# Patient Record
Sex: FEMALE | Race: ASIAN | NOT HISPANIC OR LATINO | URBAN - METROPOLITAN AREA
[De-identification: names, ages, dates, MRNs, and addresses within clinical notes are randomized per-mention and may not be internally consistent; named-entity substitution may affect disease eponyms.]

---

## 2018-08-30 ENCOUNTER — TRANSCRIBE ORDERS (OUTPATIENT)
Dept: PERINATAL CARE | Facility: CLINIC | Age: 33
End: 2018-08-30

## 2018-08-30 DIAGNOSIS — O09.90 SUPERVISION OF HIGH RISK PREGNANCY, ANTEPARTUM, UNSPECIFIED TRIMESTER: Primary | ICD-10-CM

## 2018-08-30 DIAGNOSIS — O99.810 ABNORMAL MATERNAL GLUCOSE TOLERANCE, COMPLICATING PREGNANCY: ICD-10-CM

## 2018-10-15 ENCOUNTER — OFFICE VISIT (OUTPATIENT)
Dept: PERINATAL CARE | Facility: CLINIC | Age: 33
End: 2018-10-15
Payer: COMMERCIAL

## 2018-10-15 VITALS
HEIGHT: 60 IN | SYSTOLIC BLOOD PRESSURE: 108 MMHG | DIASTOLIC BLOOD PRESSURE: 69 MMHG | BODY MASS INDEX: 23.01 KG/M2 | HEART RATE: 91 BPM | WEIGHT: 117.2 LBS

## 2018-10-15 VITALS
HEIGHT: 60 IN | SYSTOLIC BLOOD PRESSURE: 108 MMHG | HEART RATE: 91 BPM | BODY MASS INDEX: 23.01 KG/M2 | DIASTOLIC BLOOD PRESSURE: 69 MMHG | WEIGHT: 117.2 LBS

## 2018-10-15 DIAGNOSIS — O24.410 DIET CONTROLLED GESTATIONAL DIABETES MELLITUS (GDM) IN SECOND TRIMESTER: Primary | ICD-10-CM

## 2018-10-15 DIAGNOSIS — O99.810 ABNORMAL GLUCOSE TOLERANCE TEST (GTT) DURING PREGNANCY, ANTEPARTUM: ICD-10-CM

## 2018-10-15 DIAGNOSIS — Z3A.16 16 WEEKS GESTATION OF PREGNANCY: ICD-10-CM

## 2018-10-15 PROCEDURE — G0108 DIAB MANAGE TRN  PER INDIV: HCPCS

## 2018-10-15 PROCEDURE — 99214 OFFICE O/P EST MOD 30 MIN: CPT | Performed by: NURSE PRACTITIONER

## 2018-10-15 RX ORDER — PNV,CALCIUM 72/IRON/FOLIC ACID 27 MG-1 MG
1 TABLET ORAL DAILY
Refills: 0 | COMMUNITY
Start: 2018-09-28 | End: 2019-01-07 | Stop reason: SDUPTHER

## 2018-10-15 RX ORDER — LANCETS
EACH MISCELLANEOUS
Qty: 100 EACH | Refills: 6 | Status: SHIPPED | OUTPATIENT
Start: 2018-10-15 | End: 2019-03-04 | Stop reason: SDUPTHER

## 2018-10-15 RX ORDER — LANCETS 30 GAUGE
EACH MISCELLANEOUS
COMMUNITY
Start: 2016-09-14 | End: 2018-10-15

## 2018-10-15 RX ORDER — FERROUS SULFATE 325(65) MG
1 TABLET ORAL DAILY
Refills: 0 | COMMUNITY
Start: 2018-09-21

## 2018-10-15 RX ORDER — BLOOD SUGAR DIAGNOSTIC
STRIP MISCELLANEOUS
Qty: 100 EACH | Refills: 6 | Status: SHIPPED | OUTPATIENT
Start: 2018-10-15 | End: 2019-03-04 | Stop reason: SDUPTHER

## 2018-10-15 RX ORDER — IRON,CARBONYL/ASCORBIC ACID 100-250 MG
325 TABLET ORAL
COMMUNITY
End: 2019-01-07 | Stop reason: SDUPTHER

## 2018-10-15 NOTE — PATIENT INSTRUCTIONS
1  Start gestational diabetes diet with 3 meals and 3 snacks including protein  Eat every 2 to 3 hours during the day and no more than 10 hours of fasting overnight  2  Start self monitoring blood glucose fasting and 2 hours after start of each meal  Goals fasting 60 to 90, 2 hours post start of meal 120 or less  3  Keep glucose log and report every Monday or as requested  4  Continue prenatal vitamin and iron as ordered by your OB  5  Stay active if no restriction from your OB, walking 30 minutes a day  6  Schedule ultrasound with Maternal Fetal Medicine  7  Continue follow-up with your OB and MFM as recommended  8  Schedule an appointment with dietician for diet review  9  Follow-up with diabetes education in office as needed

## 2018-10-15 NOTE — PROGRESS NOTES
DATE:  10/15/18  RE: Becky Hebert    : 1985    ADDISON: Estimated Date of Delivery: 3-30-19  EGA: 16w2d    Dear Dr christina HARTMAN [facility]: Thank you for referring your patient to the Diabetes and Pregnancy Program at [address]  The patient back to back educational sessions, declining a German interpretor, and received the following education:    Weight gain during in pregnancy  Based on the patients height of 5' (1 524 m) inches, pre-pregnancy weight of 112 pounds BMI 21 9, we would recommend a total weight gain of 25-35 pounds for the pregnancy   The patients current weight is 53 2 kg (117 lb 3 2 oz)pounds, and her weight gain to date is 5 pounds 3 2 ounces  Based on this, we recommend a weight gain of 25-30 pounds for the remainder of the pregnancy   Medical Nutrition Therapy for diabetes and pregnancy  The patient was instructed on the following:  o Individualized meal plan, 1700 calories  o Use of food diary to maintain a meal plan    o Importance of protein as it relates to blood glucose control   Ultrasounds every four weeks in the [address] to evaluate fetal growth   Exercise Guidelines:   o Walking up to thirty minutes daily can reduce blood glucose levels  o Monitor for greater than four contractions per hour     o The patient has been instructed not to begin physical activity if she has been instructed not to exercise by your office   Sick day guidelines and hypoglycemia with treatment   Post-partum guidelines:  o Completion of a 75 gram glucose tolerance test at 6 weeks post-partum to check for type 2 diabetes  o 20% weight loss and 30 minutes of exercise 5 times per week reduces the risk of type 2 diabetes   Breastfeeding guidelines   Report blood glucose levels to [address] weekly or as directed  o Phone: [phone]   If no response in 24 hours, call [phone]    o Fax: [phone]  o Email: [email or web address]    Please contact the Diabetes and Pregnancy Program at [phone] if you have questions  Time spent with patient [id number]; time spent face to face counseling greater than 50% of the appointment      Sincerely,     Melani Hanson, RN  Diabetes Educator  Diabetes and Pregnancy Program

## 2018-10-15 NOTE — PROGRESS NOTES
Assessment/Plan:      Diagnoses and all orders for this visit:    Diet controlled gestational diabetes mellitus (GDM) in second trimester  -     HEMOGLOBIN A1C W/ EAG ESTIMATION; Future  -     Comprehensive metabolic panel; Future  -     TSH, 3rd generation; Future  -     T4, free; Future  -     ACCU-CHEK GUIDE test strip; Use 4 a day and as instructed  -     ACCU-CHEK FASTCLIX LANCETS MISC; Use 4 a day and as directed  -     HEMOGLOBIN A1C W/ EAG ESTIMATION  -     Comprehensive metabolic panel  -     TSH, 3rd generation  -     T4, free    16 weeks gestation of pregnancy  -     HEMOGLOBIN A1C W/ EAG ESTIMATION; Future  -     Comprehensive metabolic panel; Future  -     TSH, 3rd generation; Future  -     T4, free; Future  -     ACCU-CHEK GUIDE test strip; Use 4 a day and as instructed  -     ACCU-CHEK FASTCLIX LANCETS MISC; Use 4 a day and as directed  -     HEMOGLOBIN A1C W/ EAG ESTIMATION  -     Comprehensive metabolic panel  -     TSH, 3rd generation  -     T4, free    Abnormal glucose tolerance test (GTT) during pregnancy, antepartum  -     HEMOGLOBIN A1C W/ EAG ESTIMATION; Future  -     Comprehensive metabolic panel; Future  -     TSH, 3rd generation; Future  -     T4, free; Future  -     ACCU-CHEK GUIDE test strip; Use 4 a day and as instructed  -     ACCU-CHEK FASTCLIX LANCETS MISC; Use 4 a day and as directed  -     HEMOGLOBIN A1C W/ EAG ESTIMATION  -     Comprehensive metabolic panel  -     TSH, 3rd generation  -     T4, free    Other orders  -     Discontinue: glucose blood test strip; Accu check smartview test strips  Check sugars up to 6 times daily  O24 414   -     ferrous sulfate 325 (65 Fe) mg tablet; Take 1 tablet by mouth daily  -     Iron-Vitamin C (IRON 100/C) 100-250 MG TABS; Take 325 mg by mouth 3 (three) times a day with meals  -     Discontinue: Lancets MISC; Accu check fast click lancets  Check sugars up to 6 times daily  O24 414  -     metFORMIN (GLUCOPHAGE) 500 mg tablet;  Take 2 tablet po with breakfast and 3 tablets po with dinner as instructed  -     IRON PO; Take 1 tablet by mouth  -     Prenatal Vit-Fe Fumarate-FA (PREPLUS) 27-1 MG TABS; Take 1 tablet by mouth daily        1  Start gestational diabetes diet with 3 meals and 3 snacks including protein  Eat every 2 to 3 hours during the day and no more than 10 hours of fasting overnight  2  Start self monitoring blood glucose fasting and 2 hours after start of each meal  Goals fasting 60 to 90, 2 hours post start of meal 120 or less  3  Keep glucose log and report every Monday or as requested  4  Continue prenatal vitamin and iron as ordered by your OB  5  Stay active if no restriction from your OB, walking 30 minutes a day  6  Schedule ultrasound with Maternal Fetal Medicine  7  Continue follow-up with your OB and MFM as recommended  8  Schedule an appointment with dietician for diet review  9  Follow-up with diabetes education in office as needed  Discussed importance of tight glucose control during pregnancy and risk factors reviewed  Reviewed insulin requirements during pregnancy  Encouraged to add protein to diet with nuts, egg and cheese  Subjective:     Patient ID: Jono Li is a 34 y o  female  , ADDISON 3/30/19, currently 16 2/7 weeks gestation  Referred for gestational diabetes management  History of GDM treated with Metformin in 2016, had a baby boy who weighed 7 lbs  She has 3 boys, a 15 yo, 7 yo and 3 yo  From [facility] and refused to use language line  Complains of fatigue and increase in thirst and hunger  Diet higher in carbs and veggies, does not eat meat  [address]- [place] has diabetes and father had open heart surgery  Review of Systems   Constitutional: Positive for fatigue  Respiratory: Negative for cough and shortness of breath  Gastrointestinal: Negative for constipation, nausea and vomiting  Endocrine: Positive for polydipsia and polyphagia  Negative for polyuria     Neurological: Negative for headaches  Psychiatric/Behavioral: Negative for sleep disturbance  Objective:  Vitals:    10/15/18 1314   BP: 108/69   Pulse: 91   Ht  5', wt  117 lbs  Per OB report 8/21/18 1 hour 141, 3 hour , 226, 98, 115  Physical Exam   Constitutional: She is oriented to person, place, and time  Vital signs are normal  She appears well-developed and well-nourished  Pulmonary/Chest: Effort normal    Musculoskeletal: Normal range of motion  Neurological: She is alert and oriented to person, place, and time  Psychiatric: She has a normal mood and affect   Her behavior is normal  Judgment and thought content normal

## 2018-10-22 ENCOUNTER — ULTRASOUND (OUTPATIENT)
Dept: PERINATAL CARE | Facility: CLINIC | Age: 33
End: 2018-10-22
Payer: COMMERCIAL

## 2018-10-22 ENCOUNTER — OFFICE VISIT (OUTPATIENT)
Dept: PERINATAL CARE | Facility: CLINIC | Age: 33
End: 2018-10-22
Payer: COMMERCIAL

## 2018-10-22 ENCOUNTER — OB ABSTRACT (OUTPATIENT)
Dept: PERINATAL CARE | Facility: CLINIC | Age: 33
End: 2018-10-22

## 2018-10-22 VITALS
BODY MASS INDEX: 23.29 KG/M2 | SYSTOLIC BLOOD PRESSURE: 94 MMHG | HEART RATE: 97 BPM | DIASTOLIC BLOOD PRESSURE: 65 MMHG | WEIGHT: 118.6 LBS | HEIGHT: 60 IN

## 2018-10-22 DIAGNOSIS — Z34.01 ENCOUNTER FOR SUPERVISION OF NORMAL FIRST PREGNANCY, FIRST TRIMESTER: Primary | ICD-10-CM

## 2018-10-22 DIAGNOSIS — Z3A.17 17 WEEKS GESTATION OF PREGNANCY: ICD-10-CM

## 2018-10-22 DIAGNOSIS — O24.410 DIET CONTROLLED GESTATIONAL DIABETES MELLITUS (GDM) IN SECOND TRIMESTER: Primary | ICD-10-CM

## 2018-10-22 PROCEDURE — G0108 DIAB MANAGE TRN  PER INDIV: HCPCS | Performed by: DIETITIAN, REGISTERED

## 2018-10-22 NOTE — PROGRESS NOTES
Date:  10/22/18  RE: Zain Hollins    : 1985  Estimated Date of Delivery: 3/30/2019  EGA: 17 2/7 weeks  [facility]: [facility]    Diet controlled gestational diabetes    Date Fasting Post-  breakfast Post-  lunch Post-  dinner Before bedtime Carbs Comments   10/15/18   102       10/21/18 115 119 111 120      10/22/18 116 112                      Current regimen:  1700 calorie GDM diet with 3 meals & 3 snacks  Self-Blood Glucose monitoring 4 times daily  Did not begin monitoring till yesterday  Plan:  Begin 1700 calorie vegetarian GDM diet in 3 meals & 3 snacks  Diet was adapted to her cultural preferences  Agreed to eat 3 oz chicken, beef or fish at lunch daily  Will use eggs & peanuts as protein sources at breakfast, lunch & snacks  Advised she may need to add protein powder at dinner  Advised to change bedtime snack to peanuts, 1 egg & cooked vegetables  Unable to stay for insulin instruction [place]  Date due to report next:  Thursday, 10/25/18  Advised need for insulin will be determined at that time      Susan Sung  Diabetes Educator   Diabetes and Pregnancy Program

## 2018-10-22 NOTE — PROGRESS NOTES
,DATE: 10/22/18   RE: Dali Oquendo   : 1985  ADDISON: Estimated Date of Delivery: 3/30/2019  EGA:  16     Dear Lubna  At [employer] [facility]: Thank you for referring your patient to the Diabetes and Pregnancy Program at [facility]  The patient was seen today for medical nutrition therapy follow-up  for the treatment of diabetes during pregnancy  In addition to diabetes, the nutrition status is complicated by culture & following a vegetarian diet  The following was reviewed with the patient:      Weight gain during in pregnancy  Based on the patients height of 60  inches, pre-pregnancy weight of 112 pounds (BMI 21 9), we would recommend a total weight gain of 25-35 pounds for the pregnancy   The patients current weight is 118 5  pounds, and her weight gain to date is 6 5 pounds  Basic review of macronutrients   Meal pattern should consist of three small meals and three snacks daily   Appropriate serving size of foods   Incorporating protein at each meal and snack in the importance of protein in relationship to blood glucose control   Individualized meal plan: 1700 calorie--vegetarian gestational diabetes diet  Meal plan adapted to her cultural eating habits  Protein sources are--small amount chicken, beef or fish weekly, eggs, nuts carl peanuts & yogurt  Prefers only vegetarian foods at dinner; advised to eat an egg & add protein powder  Agreed to eat 3 oz chicken or beef or fish at lunch daily  Due to high FBS results, advised to change HS to only protein; agreed to change to peanuts, 1 egg & cooked vegetables   Report blood glucose levels to [address] weekly or as directed  Was not able to stay for insulin education as had another appointment in 1/2 hour in [facility]  o Phone: [phone]  If no response in 24 hours, call [phone]   o Fax: [phone]  o Email: [email or web address]   Follow up: As needed    Thank you for the opportunity to participate in the care of this patient  I can be reached at [phone] should you have any questions  Time spent with patient 11:15-11:55 AM; time spent face to face counseling greater than 50% of the appointment      Sincerely,     Marie Salcedo  Diabetes Educator  Diabetes and Pregnancy Program

## 2018-11-02 LAB
ALBUMIN SERPL-MCNC: 3.8 G/DL (ref 3.5–5.5)
ALBUMIN/GLOB SERPL: 1.5 {RATIO} (ref 1.2–2.2)
ALP SERPL-CCNC: 46 IU/L (ref 39–117)
ALT SERPL-CCNC: 18 IU/L (ref 0–32)
AST SERPL-CCNC: 21 IU/L (ref 0–40)
BILIRUB SERPL-MCNC: <0.2 MG/DL (ref 0–1.2)
BUN SERPL-MCNC: 5 MG/DL (ref 6–20)
BUN/CREAT SERPL: 10 (ref 9–23)
CALCIUM SERPL-MCNC: 9.2 MG/DL (ref 8.7–10.2)
CHLORIDE SERPL-SCNC: 101 MMOL/L (ref 96–106)
CO2 SERPL-SCNC: 22 MMOL/L (ref 20–29)
CREAT SERPL-MCNC: 0.5 MG/DL (ref 0.57–1)
EST. AVERAGE GLUCOSE BLD GHB EST-MCNC: 131 MG/DL
GLOBULIN SER-MCNC: 2.6 G/DL (ref 1.5–4.5)
GLUCOSE SERPL-MCNC: 96 MG/DL (ref 65–99)
HBA1C MFR BLD: 6.2 % (ref 4.8–5.6)
POTASSIUM SERPL-SCNC: 3.6 MMOL/L (ref 3.5–5.2)
PROT SERPL-MCNC: 6.4 G/DL (ref 6–8.5)
SL AMB EGFR AFRICAN AMERICAN: 149 ML/MIN/1.73
SL AMB EGFR NON AFRICAN AMERICAN: 129 ML/MIN/1.73
SODIUM SERPL-SCNC: 137 MMOL/L (ref 134–144)
T4 FREE SERPL-MCNC: 0.94 NG/DL (ref 0.82–1.77)
TSH SERPL DL<=0.005 MIU/L-ACNC: 1.81 UIU/ML (ref 0.45–4.5)

## 2018-11-12 ENCOUNTER — OB ABSTRACT (OUTPATIENT)
Dept: PERINATAL CARE | Facility: CLINIC | Age: 33
End: 2018-11-12

## 2018-11-12 ENCOUNTER — OFFICE VISIT (OUTPATIENT)
Dept: PERINATAL CARE | Facility: CLINIC | Age: 33
End: 2018-11-12
Payer: COMMERCIAL

## 2018-11-12 DIAGNOSIS — O24.414 INSULIN CONTROLLED GESTATIONAL DIABETES MELLITUS IN SECOND TRIMESTER: Primary | ICD-10-CM

## 2018-11-12 DIAGNOSIS — Z3A.20 20 WEEKS GESTATION OF PREGNANCY: ICD-10-CM

## 2018-11-12 PROCEDURE — G0108 DIAB MANAGE TRN  PER INDIV: HCPCS | Performed by: DIETITIAN, REGISTERED

## 2018-11-12 NOTE — PROGRESS NOTES
DATE: 18   RE: aRh Or   : 1985  ADDISON: Estimated Date of Delivery: 3/30/2019  EGA:    20 2/7 weeks    Dear Drs  At [facility]:     Your patient was seen in the office today for insulin teaching  Please refer to Diabetes and Pregnancy Progress Record for complete documentation of patients blood glucose levels  Height: 60"     Weight: 118 6 pounds      The patient was instructed on the following:     Lucero Kwik Pen use  Initiate 16 units Basaglar at bedtime   Insulin administration times, insulin action   Hypoglycemia signs, symptoms and treatment   Increase in maternal-fetal surveillance with insulin initiation   Side effects of hyperglycemia in pregnancy including macrosomia,  hypoglycemia, polyhydramnios, pre-term labor and stillbirth   Continue to monitor blood glucoses via fingerstick fasting (goal 60 mg/dl to 90 mg/dl) and two hours post prandial (goal less than 120 mg/dl)   Follow up with our office on Friday, 18 for evaluation of blood glucose levels   Non-stress testing two times weekly and ANNETTE testing beginning at 32 weeks gestation   Ultrasounds every 4 weeks at the [address]   HbA1c every 6 to 8 weeks, CMP ordered several weeks ago  Thank you for the opportunity to participate in the care of this patient  I can be reached at [phone] should you have any questions  Time spent with patient 9:15-10 AM; time spent face to face counseling greater than 50% of the appointment      Sincerely,     Dom Candelaria  Diabetes Educator  Diabetes and Pregnancy Program

## 2018-11-12 NOTE — PROGRESS NOTES
Date:  18  RE: Spring Lucia    : 1985  Estimated Date of Delivery: 3/30/2019  EGA: 20 2/7 weeks  OB/GYN: [facility]    Diet controlled gestational diabetes    Date Fasting Post-  breakfast Post-  lunch Post-  dinner Before bedtime Carbs Comments   10/29/18 108         10/30/18 107         18 115         18 116         18 100 120 132 125      18 108 133 140 120      11/10/18 102 122 129 130      18 99 120 130 140      Many missing BG results  Current regimen:  1700 calorie GDM--vegetarian diet with 3 meals & 3 snacks  Self-Blood Glucose monitoring 4 times daily  Plan:  Begin 16 units Basaglar at bedtime; prescriptions sent to her pharmacy  Diet was adapted to her cultural preferences  Had agreed at last visit to eat 3 oz chicken, beef or fish at lunch daily, but patient admits she does not always eat meat daily  Uses eggs & peanuts as protein sources at breakfast, dinner & snacks  Advised she may need to add protein powder at dinner  Admits to skipping HS snack as continues to eat dinner at 10:30 PM   Her late dinner is contributing to her high FBS  Admits she does not always add protein foods to each meal & snack  Continuing monitoring 4 times daily  Reports she had a low Hgb & is to begin Fe infusions  18 KlE1a-7 2%; reorder week of 18      Date due to report next:  Friday, 18    Mejia Penn  Diabetes Educator   Diabetes and Pregnancy Program

## 2018-11-13 RX ORDER — INSULIN GLARGINE 100 [IU]/ML
INJECTION, SOLUTION SUBCUTANEOUS
Qty: 15 ML | Refills: 0 | Status: SHIPPED | OUTPATIENT
Start: 2018-11-13 | End: 2019-01-07 | Stop reason: SDUPTHER

## 2018-11-13 RX ORDER — BLOOD SUGAR DIAGNOSTIC
1 STRIP MISCELLANEOUS DAILY
Qty: 100 EACH | Refills: 5 | Status: SHIPPED | OUTPATIENT
Start: 2018-11-13 | End: 2019-01-07 | Stop reason: SDUPTHER

## 2019-01-07 ENCOUNTER — OB ABSTRACT (OUTPATIENT)
Dept: PERINATAL CARE | Facility: CLINIC | Age: 34
End: 2019-01-07

## 2019-01-07 DIAGNOSIS — Z3A.28 28 WEEKS GESTATION OF PREGNANCY: Primary | ICD-10-CM

## 2019-01-07 DIAGNOSIS — O24.414 INSULIN CONTROLLED GESTATIONAL DIABETES MELLITUS IN SECOND TRIMESTER: ICD-10-CM

## 2019-01-07 DIAGNOSIS — D56.0 ALPHA+ THALASSEMIA (HCC): ICD-10-CM

## 2019-01-07 DIAGNOSIS — O99.019 IRON DEFICIENCY ANEMIA DURING PREGNANCY: ICD-10-CM

## 2019-01-07 DIAGNOSIS — D50.9 IRON DEFICIENCY ANEMIA DURING PREGNANCY: ICD-10-CM

## 2019-01-07 DIAGNOSIS — O24.414 INSULIN CONTROLLED GESTATIONAL DIABETES MELLITUS (GDM) IN THIRD TRIMESTER: ICD-10-CM

## 2019-01-07 RX ORDER — BLOOD SUGAR DIAGNOSTIC
1 STRIP MISCELLANEOUS DAILY
Qty: 100 EACH | Refills: 0 | Status: SHIPPED | OUTPATIENT
Start: 2019-01-07 | End: 2019-02-27 | Stop reason: SDUPTHER

## 2019-01-07 RX ORDER — PNV,CALCIUM 72/IRON/FOLIC ACID 27 MG-1 MG
1 TABLET ORAL DAILY
Qty: 90 TABLET | Refills: 3 | Status: SHIPPED | OUTPATIENT
Start: 2019-01-07

## 2019-01-07 RX ORDER — IRON,CARBONYL/ASCORBIC ACID 100-250 MG
325 TABLET ORAL 2 TIMES DAILY WITH MEALS
Qty: 90 EACH | Refills: 3 | Status: SHIPPED | OUTPATIENT
Start: 2019-01-07

## 2019-01-07 RX ORDER — INSULIN GLARGINE 100 [IU]/ML
INJECTION, SOLUTION SUBCUTANEOUS
Qty: 15 ML | Refills: 0 | Status: SHIPPED | OUTPATIENT
Start: 2019-01-07 | End: 2019-02-27 | Stop reason: SDUPTHER

## 2019-01-07 NOTE — PROGRESS NOTES
Met with patient at [facility]  Taking insulin nightly; fastings,   I refilled her needles and increased the Basaglar to 22 units nightly  Instructed her how to replace the batteries on her glucose meter which ran out a week ago  Asked her to re-establish care with [facility] program ASAMAGDA Beauchamp MD

## 2019-01-10 ENCOUNTER — OB ABSTRACT (OUTPATIENT)
Dept: PERINATAL CARE | Facility: CLINIC | Age: 34
End: 2019-01-10

## 2019-01-10 NOTE — PROGRESS NOTES
Date:  01/10/19  RE: Nichole Alva    : 1985  Estimated Date of Delivery: 3/30/2019  EGA: 28 5/7 weeks  [facility]: [facility]    Insulin controlled gestational diabetes    Date Fasting Post-  breakfast Post-  lunch Post-  dinner Before bedtime Carbs Comments                                                                                   Last blood glucose report was on 18  Dr Flor Joshua met with patient at [facility] on 19  Fasting blood sugars range between 99-10 mg/dl  Paddy Skates was increased to 22 units at bedtime  A refill prescription for needles was sent to the pharmacy as well as glucose meter batteries were replaced per Dr Edilson Palomo notes  Current regimen:  Basaglar- 22 units at bedtime  1700 calorie GDM--vegetarian diet with 3 meals & 3 snacks  Self-Blood Glucose monitoring 4 times daily  Plan:  A voicemail message was left for patient requesting blood glucose report  Blood sugar reporting procedure was again reviewed  An e-mail was also sent to Benton Lechuga RD who works at [employer] at [facility] to request a follow-up appointment to reinforce blood glucose reporting and GDM diet  Patient speaks Yoruba  Basaglar- 22 units at bedtime  Diet was adapted to her cultural preferences based on her last blood glucose report  Patient agreed to eat 3 oz chicken, beef or fish at lunch daily  Uses eggs & peanuts as protein sources at breakfast, dinner & snacks  Advised patient that protein powder may need to be added at dinner  Patient had admited to skipping bedtime snack due to eating dinner at 10:30 PM   Her late dinner was contributing to her high FBS  Continuing monitoring 4 times daily  Patient reported she had a low Hgb & is to begin Fe infusions at her last appointmet in 18 LsU8b-1 2%; reorder week of 18  Will schedule a follow-up appointment to provide lab prescription for HbA1c and reinforce both diet and blood glucose reporting  Date due to report next:  Requested patient report blood glucose values today on voicemail message       Tyree Serrato  Diabetes Educator   Diabetes and Pregnancy Program

## 2019-01-14 ENCOUNTER — OFFICE VISIT (OUTPATIENT)
Dept: PERINATAL CARE | Facility: CLINIC | Age: 34
End: 2019-01-14
Payer: COMMERCIAL

## 2019-01-14 VITALS
DIASTOLIC BLOOD PRESSURE: 68 MMHG | SYSTOLIC BLOOD PRESSURE: 114 MMHG | BODY MASS INDEX: 25.19 KG/M2 | WEIGHT: 128.3 LBS | HEART RATE: 101 BPM | HEIGHT: 60 IN

## 2019-01-14 DIAGNOSIS — Z3A.29 29 WEEKS GESTATION OF PREGNANCY: ICD-10-CM

## 2019-01-14 DIAGNOSIS — O24.414 INSULIN CONTROLLED GESTATIONAL DIABETES MELLITUS (GDM) IN THIRD TRIMESTER: Primary | ICD-10-CM

## 2019-01-14 PROCEDURE — G0108 DIAB MANAGE TRN  PER INDIV: HCPCS

## 2019-01-14 NOTE — PROGRESS NOTES
Date:  19  RE: Anna Garcia    : 1985  Estimated Date of Delivery: 3/30/19  EGA: 29w2d  OB[facility]GYN: [facility]      Blood glucose values from patient log she brought to appointment, no other BG values recorded in this logbook  Current regimen:  Basaglar-  22 units increased to this dose last week by Dr Carlson Angry  1700 calorie GDM--vegetarian diet with 3 meals & 3 snacks including protein     Self-Blood Glucose monitoring fasting and 2 hours after start of each meal with      Plan:  Though patient declined all communication with patient today was with Sam interpretor, Dawit #[id number]  Basaglar-increase to 28 units at bedtime  Add metformin- 500 mg with dinner, titrate weekly  Emphasized need to eat protein at all meals and snacks patient shares she is eating eggs, peanuts and cheeses as proteins  Discussed importance of SMBG everyday and need to report weekly  Established will call patient weekly at [phone], on , with Sam interpretor, to obtain blood sugars  18 HbA1c- 6 2% CMP- WNL during pregnancy, re-ordered labs today with instruction to complete this week  Date due to report next:  Scheduled follow up visit with RD in [address] [facility] for Monday, 19 at 1100 and requested patient bring blood sugars to appointment      Marybeth Limon RN  Diabetes Educator   Diabetes and Pregnancy Program

## 2019-01-16 LAB
ALBUMIN SERPL-MCNC: 3.6 G/DL (ref 3.5–5.5)
ALBUMIN/GLOB SERPL: 1.3 {RATIO} (ref 1.2–2.2)
ALP SERPL-CCNC: 62 IU/L (ref 39–117)
ALT SERPL-CCNC: 15 IU/L (ref 0–32)
AST SERPL-CCNC: 19 IU/L (ref 0–40)
BILIRUB SERPL-MCNC: <0.2 MG/DL (ref 0–1.2)
BUN SERPL-MCNC: 8 MG/DL (ref 6–20)
BUN/CREAT SERPL: 15 (ref 9–23)
CALCIUM SERPL-MCNC: 9.1 MG/DL (ref 8.7–10.2)
CHLORIDE SERPL-SCNC: 104 MMOL/L (ref 96–106)
CO2 SERPL-SCNC: 21 MMOL/L (ref 20–29)
CREAT SERPL-MCNC: 0.52 MG/DL (ref 0.57–1)
EST. AVERAGE GLUCOSE BLD GHB EST-MCNC: 114 MG/DL
GLOBULIN SER-MCNC: 2.7 G/DL (ref 1.5–4.5)
GLUCOSE SERPL-MCNC: 86 MG/DL (ref 65–99)
HBA1C MFR BLD: 5.6 % (ref 4.8–5.6)
POTASSIUM SERPL-SCNC: 3.4 MMOL/L (ref 3.5–5.2)
PROT SERPL-MCNC: 6.3 G/DL (ref 6–8.5)
SL AMB EGFR AFRICAN AMERICAN: 147 ML/MIN/1.73
SL AMB EGFR NON AFRICAN AMERICAN: 128 ML/MIN/1.73
SODIUM SERPL-SCNC: 139 MMOL/L (ref 134–144)

## 2019-01-17 DIAGNOSIS — O24.419 GDM, CLASS A2: Primary | ICD-10-CM

## 2019-01-21 ENCOUNTER — OB ABSTRACT (OUTPATIENT)
Dept: PERINATAL CARE | Facility: CLINIC | Age: 34
End: 2019-01-21

## 2019-01-21 ENCOUNTER — OFFICE VISIT (OUTPATIENT)
Dept: PERINATAL CARE | Facility: CLINIC | Age: 34
End: 2019-01-21
Payer: COMMERCIAL

## 2019-01-21 VITALS
DIASTOLIC BLOOD PRESSURE: 84 MMHG | BODY MASS INDEX: 25.39 KG/M2 | SYSTOLIC BLOOD PRESSURE: 123 MMHG | WEIGHT: 130 LBS | HEART RATE: 109 BPM

## 2019-01-21 DIAGNOSIS — Z3A.30 30 WEEKS GESTATION OF PREGNANCY: ICD-10-CM

## 2019-01-21 DIAGNOSIS — O24.414 INSULIN CONTROLLED GESTATIONAL DIABETES MELLITUS (GDM) IN THIRD TRIMESTER: Primary | ICD-10-CM

## 2019-01-21 PROCEDURE — G0108 DIAB MANAGE TRN  PER INDIV: HCPCS

## 2019-01-21 NOTE — PROGRESS NOTES
Date:  19  RE: Nichole Alva    : 1985  Estimated Date of Delivery: 3/30/19  EGA: 30w2d  [facility]      Insulin controlled gestational diabetes  Date Fasting Post-  breakfast Post-  lunch Post-  dinner Before bedtime Carbs Comments    94 144 120 115       93 110 118 113       98 110                                                  Follow up appointment completed today with Sam interpretor, Magenalina #[id number]  Current regimen:  Basaglar-  28 units, however patient reported she only increased to 26 units  Metformin-500 mg with dinner, titrate weekly  1700 calorie GDM--vegetarian diet with 3 meals & 3 snacks including protein     Self-Blood Glucose monitoring fasting and 2 hours after start of each meal with an Accu-chek guide blood glucose monitor  Plan:  Basaglar-increase to 28 units at bedtime  Fasting blood sugar and 2 hour pp target ranges were reviewed with patient  Patient is agreeable to increase to 28 units  Metformin- add 500 mg with breakfast, continue 500 mg with dinner  Emphasized need to eat protein at all meals and snacks patient shares she is eating eggs, peanuts and cheeses, nut butters, some chicken and fish as proteins  Patient understands the importance of timing of meals and snacks  Discussed importance of SMBG everyday and need to report weekly  Established will call patient weekly at [phone], on , with Sam interpretor, to obtain blood sugars per previous report  Reviewed sending a photo of blood sugar log book via blood sugar e-mail with patient  Patient's  is agreeable to report blood glucose values via e-mail  18 HbA1c- 6 2% CMP- WNL during pregnancy, re-ordered labs today with instruction to complete this week  Again encouraged patient to complete blood work      Date due to report next:  Friday, 19    Ana Lilia Ar, RD,LDN,CDE  Diabetes Educator   Diabetes and Pregnancy Program

## 2019-01-21 NOTE — PROGRESS NOTES
DATE: 19   RE: Binta Galan   : 1985  ADDSION: Estimated Date of Delivery: 3/30/19  EGA:  30w2d    Dear Dr Bev Siddiqi: Thank you for referring your patient to the Diabetes and Pregnancy Program at [address] [place] [address]  The patient was seen today for medical nutrition therapy for the treatment of diabetes during pregnancy  In addition to diabetes, the nutrition status is complicated by vegan diet, protein sources include nuts,egg, small amount of chicken, beef and some fish, yogurt  The patient is agreeable to add protein powder as needed  The following was reviewed with the patient:      Weight gain during in pregnancy  Based on the patients height of 60 inches, pre-pregnancy weight of 112 pounds (BMI 21 9) we would recommend a total weight gain of 25-35 pounds for the pregnancy  o The patients current weight is 59 kg (130 lb) pounds, and her weight gain to date is 18 pounds  Based on this, we are recommending the patient gain no more than 17 pounds for the remainder of the pregnancy   Basic review of macronutrients   Meal pattern should consist of three small meals and three snacks daily   Carbohydrate gram amounts per meal    Instructions on how to read a food label   Appropriate serving size of foods   Incorporating protein at each meal and snack in the importance of protein in relationship to blood glucose control   Individualized meal plan: approximately 1800 calorie gestational diabetes diet   Diet history revealed patient is following diet fairly well and has been including protein in meals and most snacks  Patient is eating a late dinner at 9-10 pm  Patient is often missing mid-afternoon snack  Advised to consistently follow meal plan with appropriate timing of meals and snacks, eating an earlier dinner with a bedtime snack  Instructed patient to maintain an 8-10 hour time frame between bedtime snack and fasting blood glucose check   Report blood glucose levels to [address] weekly or as directed  Reviewed testing and reporting procedure with patient  Patient stated her  will e-mail a copy of blood sugar log book on next report  o Phone: [phone]  If no response in 24 hours, call [phone]   o Fax: [phone]  o Email: [email or web address]   Noted last 1/15/19 HbA1c  was 5 6% , 11/1/18 HbA1c 6 2%  Next Ultrasound is scheduled for 2/4/19  Thank you for the opportunity to participate in the care of this patient  I can be reached at [phone] should you have any questions  Time spent with patient [phone]; time spent face to face counseling greater than 50% of the appointment      Sincerely,     Keyla Cadena RD,LDN,CDE  Diabetes Educator  Diabetes and Pregnancy Program

## 2019-02-04 ENCOUNTER — ULTRASOUND (OUTPATIENT)
Dept: PERINATAL CARE | Facility: CLINIC | Age: 34
End: 2019-02-04
Payer: COMMERCIAL

## 2019-02-04 VITALS
SYSTOLIC BLOOD PRESSURE: 121 MMHG | WEIGHT: 127.5 LBS | BODY MASS INDEX: 25.03 KG/M2 | DIASTOLIC BLOOD PRESSURE: 70 MMHG | HEART RATE: 106 BPM | HEIGHT: 60 IN

## 2019-02-04 DIAGNOSIS — Z3A.32 32 WEEKS GESTATION OF PREGNANCY: ICD-10-CM

## 2019-02-04 DIAGNOSIS — O24.414 INSULIN CONTROLLED GESTATIONAL DIABETES MELLITUS (GDM) IN THIRD TRIMESTER: Primary | ICD-10-CM

## 2019-02-04 PROCEDURE — 76816 OB US FOLLOW-UP PER FETUS: CPT | Performed by: OBSTETRICS & GYNECOLOGY

## 2019-02-04 PROCEDURE — 99212 OFFICE O/P EST SF 10 MIN: CPT | Performed by: OBSTETRICS & GYNECOLOGY

## 2019-02-04 NOTE — PROGRESS NOTES
Please refer to the [facility] ultrasound report in Ob Procedures for additional information regarding the visit to the [facility]  today

## 2019-02-27 ENCOUNTER — DOCUMENTATION (OUTPATIENT)
Dept: PERINATAL CARE | Facility: CLINIC | Age: 34
End: 2019-02-27

## 2019-02-27 DIAGNOSIS — O24.414 INSULIN CONTROLLED GESTATIONAL DIABETES MELLITUS IN SECOND TRIMESTER: ICD-10-CM

## 2019-02-27 DIAGNOSIS — O24.414 INSULIN CONTROLLED GESTATIONAL DIABETES MELLITUS (GDM) IN THIRD TRIMESTER: ICD-10-CM

## 2019-02-27 RX ORDER — INSULIN GLARGINE 100 [IU]/ML
INJECTION, SOLUTION SUBCUTANEOUS
Qty: 15 ML | Refills: 0 | Status: SHIPPED | OUTPATIENT
Start: 2019-02-27 | End: 2019-07-14

## 2019-02-27 RX ORDER — BLOOD SUGAR DIAGNOSTIC
STRIP MISCELLANEOUS
Qty: 100 EACH | Refills: 2 | Status: SHIPPED | OUTPATIENT
Start: 2019-02-27

## 2019-02-27 NOTE — PROGRESS NOTES
Date:  19  RE: Becky Hebert    : 1985  Estimated Date of Delivery: 3/30/19  EGA: 35w4d  OB/GYN: [facility]      Insulin controlled gestational diabetes  Date Fasting Post-  breakfast Post-  lunch Post-  dinner Before bedtime Carbs Comments                                   Last reported BG was at appointment on 19  Diabetes [facility] Pregnancy program was contacted today by Guanakito Crow RD, CDE at [place] [id number] via e-mail  She reported patient is no longer taking insulin as it is "stuck" in the pen, cannot get more insulin from her pharmacy & stated her pen needles are not working  Also, reported she is no longer testing her BG  Current regimen:  Basaglar-  28 units  Metformin-500 mg with breakfast & dinner  1700 calorie GDM--vegetarian diet with 3 meals & 3 snacks including protein     Self-Blood Glucose monitoring fasting and 2 hours after start of each meal with an Accu-chek guide blood glucose monitor  Plan:  Attempted to contact patient several times via phone to her home & to her   Left message with  that new insulin & pen needle prescriptions were sent to her pharmacy  Continue current regimen especially with protein at all meals and snacks  Discussed importance of SMBG everyday and need to report weekly  Patient's  had agreed before to report blood glucose values via e-mail  18 HbA1c- 6 2% Patient needs to complete blood work soon       Date due to report next:  As soon as possible  Follow-up appointment to be scheduled by Diabetes & Pregnancy       Ashley Sánchez RD,LDN,CDE  Diabetes Educator   [facility]

## 2019-03-04 ENCOUNTER — OFFICE VISIT (OUTPATIENT)
Dept: PERINATAL CARE | Facility: CLINIC | Age: 34
End: 2019-03-04
Payer: COMMERCIAL

## 2019-03-04 ENCOUNTER — ULTRASOUND (OUTPATIENT)
Dept: PERINATAL CARE | Facility: CLINIC | Age: 34
End: 2019-03-04
Payer: COMMERCIAL

## 2019-03-04 VITALS
HEART RATE: 103 BPM | SYSTOLIC BLOOD PRESSURE: 116 MMHG | DIASTOLIC BLOOD PRESSURE: 78 MMHG | BODY MASS INDEX: 26.66 KG/M2 | WEIGHT: 135.8 LBS | HEIGHT: 60 IN

## 2019-03-04 VITALS
WEIGHT: 132 LBS | BODY MASS INDEX: 25.91 KG/M2 | HEIGHT: 60 IN | SYSTOLIC BLOOD PRESSURE: 104 MMHG | DIASTOLIC BLOOD PRESSURE: 68 MMHG | HEART RATE: 100 BPM

## 2019-03-04 DIAGNOSIS — Z91.19 NONADHERENCE TO MEDICAL TREATMENT: ICD-10-CM

## 2019-03-04 DIAGNOSIS — O24.410 DIET CONTROLLED GESTATIONAL DIABETES MELLITUS (GDM) IN SECOND TRIMESTER: ICD-10-CM

## 2019-03-04 DIAGNOSIS — O99.810 HYPERGLYCEMIA DURING PREGNANCY: ICD-10-CM

## 2019-03-04 DIAGNOSIS — O24.414 INSULIN CONTROLLED GESTATIONAL DIABETES MELLITUS (GDM) IN THIRD TRIMESTER: Primary | ICD-10-CM

## 2019-03-04 DIAGNOSIS — O99.810 ABNORMAL GLUCOSE TOLERANCE TEST (GTT) DURING PREGNANCY, ANTEPARTUM: ICD-10-CM

## 2019-03-04 DIAGNOSIS — Z3A.36 36 WEEKS GESTATION OF PREGNANCY: ICD-10-CM

## 2019-03-04 DIAGNOSIS — Z3A.16 16 WEEKS GESTATION OF PREGNANCY: ICD-10-CM

## 2019-03-04 DIAGNOSIS — O40.3XX0 POLYHYDRAMNIOS AFFECTING PREGNANCY IN THIRD TRIMESTER: ICD-10-CM

## 2019-03-04 DIAGNOSIS — Z36.89 ENCOUNTER FOR ULTRASOUND TO CHECK FETAL GROWTH: ICD-10-CM

## 2019-03-04 PROBLEM — Z91.199 NONADHERENCE TO MEDICAL TREATMENT: Status: ACTIVE | Noted: 2019-03-04

## 2019-03-04 PROCEDURE — 59025 FETAL NON-STRESS TEST: CPT | Performed by: OBSTETRICS & GYNECOLOGY

## 2019-03-04 PROCEDURE — 99214 OFFICE O/P EST MOD 30 MIN: CPT | Performed by: NURSE PRACTITIONER

## 2019-03-04 PROCEDURE — 76816 OB US FOLLOW-UP PER FETUS: CPT | Performed by: OBSTETRICS & GYNECOLOGY

## 2019-03-04 RX ORDER — LANCETS
EACH MISCELLANEOUS
Qty: 100 EACH | Refills: 6 | Status: SHIPPED | OUTPATIENT
Start: 2019-03-04

## 2019-03-04 RX ORDER — BLOOD SUGAR DIAGNOSTIC
STRIP MISCELLANEOUS
Qty: 100 EACH | Refills: 6 | Status: SHIPPED | OUTPATIENT
Start: 2019-03-04

## 2019-03-04 NOTE — PATIENT INSTRUCTIONS
Thank you for choosing [facility]  for your  care today  If you have any questions about your ultrasound or care, please do not hesitate to contact us or your primary obstetrician  Please stay in close contact with the diabetes educators

## 2019-03-04 NOTE — PROGRESS NOTES
Assessment/Plan:    No problem-specific Assessment & Plan notes found for this encounter  Diagnoses and all orders for this visit:    Insulin controlled gestational diabetes mellitus (GDM) in third trimester  -     ACCU-CHEK FASTCLIX LANCETS MISC; Use 4 a day and as directed  -     ACCU-CHEK GUIDE test strip; Use 4 a day and as instructed  -     Hemoglobin A1C    36 weeks gestation of pregnancy  -     ACCU-CHEK FASTCLIX LANCETS MISC; Use 4 a day and as directed  -     ACCU-CHEK GUIDE test strip; Use 4 a day and as instructed  -     Hemoglobin A1C    Hyperglycemia during pregnancy  -     ACCU-CHEK FASTCLIX LANCETS MISC; Use 4 a day and as directed  -     ACCU-CHEK GUIDE test strip; Use 4 a day and as instructed  -     Hemoglobin A1C    Polyhydramnios affecting pregnancy in third trimester  -     ACCU-CHEK FASTCLIX LANCETS MISC; Use 4 a day and as directed  -     ACCU-CHEK GUIDE test strip; Use 4 a day and as instructed  -     Hemoglobin A1C    Nonadherence to medical treatment  -     ACCU-CHEK FASTCLIX LANCETS MISC; Use 4 a day and as directed  -     ACCU-CHEK GUIDE test strip; Use 4 a day and as instructed  -     Hemoglobin A1C    Diet controlled gestational diabetes mellitus (GDM) in second trimester    16 weeks gestation of pregnancy    Abnormal glucose tolerance test (GTT) during pregnancy, antepartum      1  Due to fasting glucose above 90, increase basaglar from 28 to 36 units at 9 pm daily  Please have bedtime snack with 1 carb and 2 protein servings  Add 3 am glucose reading  2  Metformin 500 mg with breakfast and 1000 mg with dinner  To be titrated as recommended  3  Please try to eat 3 meals and 3 snacks including recommended combination of protein, carbohydrate and fat per meal/snack  If unable to eat try using Glucerna or Boost as a replacement for a snack  No more than 8 to 10 hours of fasting overnight    4  Please be sure to check fasting and 2 hours after start of each meal  Keep glucose log and report glucose readings on Thursday, 3/7/19  At minimum report once a week  New glucose meter provided  5  Stay active if no restriction from your OB, up to 30 minutes a day of walking  6  Continue prenatal vitamin and follow-up with your OB  7  Schedule an appointment with family doctor regarding difficulty swallowing for evaluation  8  Check A1c, goal is 5 6% or less  9  Please complete fetal kick counts and report decrease fetal movement to your OB  10  NST twice a week and ANNETTE weekly  11  Follow-up with MFM as recommended  12  Please keep in close contact with diabetes education  15  Very important to keep tight glucose control during pregnancy to decrease risk factors  Goal is to improve compliance with regimen  Hard scripts for Accu-chek test strips and fast clicks given plus sample test strips provided  Subjective:      Patient ID: Nell Vines is a 34 y o  female  ADDISON 3/30/19, currently 36 2/7 weeks gestation  Positive fetal movement  Here for follow-up GDM, download of glucose meter shows no recent glucose data, reports using her mother in law's glucose meter  Recently restarted testing glucose, per glucose log book, over last 4 days fasting glucose 90 to 100 and 2 hours post meals above 120  Currently on basaglar 28 units at 9 pm daily, reports only taking Metformin 500 mg with dinner and forgets to take 500 mg with breakfast  States eating 3 meals and 3 snacks except recently reports having issues with swallowing and unable to eat correctly  Complains of increase in hunger, thirst and urination  The following portions of the patient's history were reviewed and updated as appropriate: allergies, current medications  Review of Systems   Constitutional: Positive for fatigue  Negative for fever  HENT: Positive for sore throat and trouble swallowing  Eyes: Negative for visual disturbance  Respiratory: Negative for cough and shortness of breath      Cardiovascular: Negative for chest pain, palpitations and leg swelling  Gastrointestinal: Negative for constipation, nausea and vomiting  Endocrine: Positive for polydipsia, polyphagia and polyuria  Genitourinary: Negative for vaginal bleeding  Neurological: Negative for dizziness and headaches  Psychiatric/Behavioral: Negative for sleep disturbance  Objective:      Component Value Date/Time    HGBA1C 5 6 01/15/2019 0822    HGBA1C 6 2 (H) 11/01/2018 0657       /78 (BP Location: Right arm, Patient Position: Sitting, Cuff Size: Standard)   Pulse 103   Ht 5' (1 524 m)   Wt 61 6 kg (135 lb 12 8 oz)   LMP 06/23/2018   BMI 26 52 kg/m²      Physical Exam   Constitutional: She is oriented to person, place, and time  She appears well-developed  She is cooperative  HENT:   Head: Normocephalic  Mouth/Throat: Tonsils are 2+ on the right  Tonsils are 2+ on the left  No tonsillar exudate  Cardiovascular: Regular rhythm, S1 normal and S2 normal  Tachycardia present  Pulmonary/Chest: Effort normal and breath sounds normal    Neurological: She is oriented to person, place, and time  Skin: Skin is warm and dry  Psychiatric: She has a normal mood and affect   Her speech is normal and behavior is normal  Thought content normal  Surgery

## 2019-03-04 NOTE — PROGRESS NOTES
Jensen Hubbard: Ms Gale Chery was seen today at 36w2d for fetal growth assessment ultrasound  See ultrasound report under "OB Procedures" tab  NSt is reactive  Please don't hesitate to contact our office with any concerns or questions    Shelton Chavarria MD

## 2019-03-04 NOTE — PATIENT INSTRUCTIONS
1  Due to fasting glucose above 90, increase basaglar from 28 to 36 units at 9 pm daily  Please have bedtime snack with 1 carb and 2 protein servings  Add 3 am glucose reading  2  Metformin 500 mg with breakfast and 1000 mg with dinner  To be titrated as recommended  3  Please try to eat 3 meals and 3 snacks including recommended combination of protein, carbohydrate and fat per meal/snack  If unable to eat try using Glucerna or Boost as a replacement for a snack  No more than 8 to 10 hours of fasting overnight  4  Please be sure to check fasting and 2 hours after start of each meal  Keep glucose log and report glucose readings on Thursday, 3/7/19  At minimum report once a week  New glucose meter provided  5  Stay active if no restriction from your OB, up to 30 minutes a day of walking  6  Continue prenatal vitamin and follow-up with your OB  7  Schedule an appointment with family doctor regarding difficulty swallowing for evaluation  8  Check A1c, goal is 5 6% or less  9  Please complete fetal kick counts and report decrease fetal movement to your OB  10  NST twice a week and ANNETTE weekly  11  Follow-up with MFM as recommended  12  Please keep in close contact with diabetes education  15  Very important to keep tight glucose control during pregnancy to decrease risk factors

## 2019-03-04 NOTE — LETTER
NST sleeve cover sheet    Patient name: Yves Evans  : 1985  MRN: [id number]    ADDISON: Estimated Date of Delivery: 3/30/19    Obstetrician: _____[facility]__________    Reason(s) for testing:  ____________Poly, GDM______________________________      Testing frequency:    _x__ 2x/wk  ___ 1x/wk  ___ Dopplers  __x_ BPP?       Last growth scan: _3/4/19_________________________________________

## 2019-03-22 LAB — HBA1C MFR BLD: 5.9 % (ref 4.8–5.6)

## 2025-07-22 ENCOUNTER — TELEPHONE (OUTPATIENT)
Age: 40
End: 2025-07-22

## 2025-07-22 DIAGNOSIS — Z11.1 TUBERCULOSIS SCREENING: Primary | ICD-10-CM

## 2025-07-31 LAB
GAMMA INTERFERON BACKGROUND BLD IA-ACNC: 0.05 IU/ML
M TB IFN-G CD4+ T-CELLS BLD-ACNC: 0.22 IU/ML
M TB IFN-G CD4+ T-CELLS BLD-ACNC: 0.23 IU/ML
MITOGEN IGNF BLD-ACNC: >10 IU/ML
QUANTIFERON INCUBATION COMMENT: NORMAL
QUANTIFERON-TB GOLD PLUS: NEGATIVE
SERVICE CMNT-IMP: NORMAL